# Patient Record
Sex: MALE | Race: WHITE | Employment: FULL TIME | URBAN - NONMETROPOLITAN AREA
[De-identification: names, ages, dates, MRNs, and addresses within clinical notes are randomized per-mention and may not be internally consistent; named-entity substitution may affect disease eponyms.]

---

## 2024-11-08 RX ORDER — IRBESARTAN 150 MG/1
150 TABLET ORAL DAILY
Qty: 180 TABLET | Refills: 3 | Status: SHIPPED | OUTPATIENT
Start: 2024-11-08

## 2025-01-03 RX ORDER — SPIRONOLACTONE 25 MG/1
TABLET ORAL
Qty: 45 TABLET | Refills: 1 | Status: SHIPPED | OUTPATIENT
Start: 2025-01-03

## 2025-01-27 ENCOUNTER — TELEPHONE (OUTPATIENT)
Dept: CARDIOLOGY CLINIC | Age: 55
End: 2025-01-27

## 2025-01-27 NOTE — TELEPHONE ENCOUNTER
[facility] office calling for clearance for extractions.  Informed them Dr Oneal is out of the office for 2 weeks.   Dental office is going to check with PCP to see if he can clear, if not they will fax a clearance form to our office. Our fax # provided.

## 2025-01-29 NOTE — TELEPHONE ENCOUNTER
Received fax that pt is needing dental extractions with [facility].  Pt last seen by Dr. Oneal 9-30-24.    Can pt be cleared?  Fax [phone]    Please send to Dr. Oneal when he returns.

## 2025-02-11 ENCOUNTER — NURSE ONLY (OUTPATIENT)
Dept: CARDIOLOGY CLINIC | Age: 55
End: 2025-02-11
Payer: COMMERCIAL

## 2025-02-11 DIAGNOSIS — Z95.810 S/P ICD (INTERNAL CARDIAC DEFIBRILLATOR) PROCEDURE: Primary | ICD-10-CM

## 2025-02-11 PROCEDURE — 93283 PRGRMG EVAL IMPLANTABLE DFB: CPT | Performed by: INTERNAL MEDICINE

## 2025-03-31 RX ORDER — CARVEDILOL 25 MG/1
TABLET ORAL
Qty: 180 TABLET | Refills: 3 | Status: SHIPPED | OUTPATIENT
Start: 2025-03-31

## 2025-04-14 ENCOUNTER — OFFICE VISIT (OUTPATIENT)
Dept: CARDIOLOGY CLINIC | Age: 55
End: 2025-04-14
Payer: COMMERCIAL

## 2025-04-14 VITALS
SYSTOLIC BLOOD PRESSURE: 129 MMHG | DIASTOLIC BLOOD PRESSURE: 87 MMHG | HEIGHT: 69 IN | BODY MASS INDEX: 30.72 KG/M2 | HEART RATE: 76 BPM | WEIGHT: 207.4 LBS

## 2025-04-14 DIAGNOSIS — I42.0 NONISCHEMIC DILATED CARDIOMYOPATHY (HCC): ICD-10-CM

## 2025-04-14 DIAGNOSIS — I50.42 CHRONIC COMBINED SYSTOLIC AND DIASTOLIC CONGESTIVE HEART FAILURE (HCC): Primary | ICD-10-CM

## 2025-04-14 DIAGNOSIS — I10 PRIMARY HYPERTENSION: ICD-10-CM

## 2025-04-14 DIAGNOSIS — E78.00 PURE HYPERCHOLESTEROLEMIA: ICD-10-CM

## 2025-04-14 DIAGNOSIS — R94.31 ABNORMAL EKG: ICD-10-CM

## 2025-04-14 DIAGNOSIS — Z95.0 PACEMAKER: ICD-10-CM

## 2025-04-14 DIAGNOSIS — Z95.810 S/P ICD (INTERNAL CARDIAC DEFIBRILLATOR) PROCEDURE: ICD-10-CM

## 2025-04-14 DIAGNOSIS — R06.02 SOB (SHORTNESS OF BREATH) ON EXERTION: ICD-10-CM

## 2025-04-14 PROCEDURE — G8417 CALC BMI ABV UP PARAM F/U: HCPCS | Performed by: INTERNAL MEDICINE

## 2025-04-14 PROCEDURE — 99214 OFFICE O/P EST MOD 30 MIN: CPT | Performed by: INTERNAL MEDICINE

## 2025-04-14 PROCEDURE — 93000 ELECTROCARDIOGRAM COMPLETE: CPT | Performed by: INTERNAL MEDICINE

## 2025-04-14 PROCEDURE — 3074F SYST BP LT 130 MM HG: CPT | Performed by: INTERNAL MEDICINE

## 2025-04-14 PROCEDURE — 4004F PT TOBACCO SCREEN RCVD TLK: CPT | Performed by: INTERNAL MEDICINE

## 2025-04-14 PROCEDURE — G8427 DOCREV CUR MEDS BY ELIG CLIN: HCPCS | Performed by: INTERNAL MEDICINE

## 2025-04-14 PROCEDURE — 3079F DIAST BP 80-89 MM HG: CPT | Performed by: INTERNAL MEDICINE

## 2025-04-14 PROCEDURE — 3017F COLORECTAL CA SCREEN DOC REV: CPT | Performed by: INTERNAL MEDICINE

## 2025-04-14 NOTE — PROGRESS NOTES
Chief Complaint   Patient presents with    Follow-up         Originally pat Was  here jan 2013 and was seeing Dr. Juarez cardiologist at [facility]  but wanted to come here because he works in [place]. He was diagnosed there with CHF , and cardiomegaly. And CMP  Had ICD 2020    Last time he saw his cardiologist dec 2021  Last lab done dec 2021        8 months Follow up    EKG completed today    Denied  chest pain,  or pressure , palpitation , dizziness or edema     Hx of Chest tightness occasional  at icd site not exertion related  for long, non radiating , no associated symptom, and unchanged    Sob on exertion    Smokes 1ppd for 15 yrs    FHX  Father had CMP  Younger brother heart issue      No past surgical history on file.    No Known Allergies     No family history on file.     Social History     Socioeconomic History    Marital status: Single     Spouse name: Not on file    Number of children: Not on file    Years of education: Not on file    Highest education level: Not on file   Occupational History    Not on file   Tobacco Use    Smoking status: Every Day     Current packs/day: 1.00     Average packs/day: 1 pack/day for 17.3 years (17.3 ttl pk-yrs)     Types: Cigarettes     Start date: 2008    Smokeless tobacco: Current     Types: Chew   Substance and Sexual Activity    Alcohol use: Not on file    Drug use: Not on file    Sexual activity: Not on file   Other Topics Concern    Not on file   Social History Narrative    Not on file     Social Drivers of Health     Financial Resource Strain: Not on file   Food Insecurity: Not on file   Transportation Needs: Not on file   Physical Activity: Not on file   Stress: Not on file   Social Connections: Not on file   Intimate Partner Violence: Not on file   Housing Stability: Not on file       Current Outpatient Medications   Medication Sig Dispense Refill    carvedilol (COREG) 25 MG tablet TAKE 1 TABLET BY MOUTH TWICE A DAY WITH BREAKFAST AND DINNER 180 tablet 3

## 2025-06-26 RX ORDER — SPIRONOLACTONE 25 MG/1
12.5 TABLET ORAL DAILY
Qty: 45 TABLET | Refills: 3 | Status: SHIPPED | OUTPATIENT
Start: 2025-06-26